# Patient Record
Sex: MALE | Race: WHITE | Employment: OTHER | URBAN - NONMETROPOLITAN AREA
[De-identification: names, ages, dates, MRNs, and addresses within clinical notes are randomized per-mention and may not be internally consistent; named-entity substitution may affect disease eponyms.]

---

## 2022-08-22 ENCOUNTER — OFFICE VISIT (OUTPATIENT)
Dept: FAMILY MEDICINE | Facility: OTHER | Age: 77
End: 2022-08-22
Attending: FAMILY MEDICINE
Payer: MEDICARE

## 2022-08-22 ENCOUNTER — LAB (OUTPATIENT)
Dept: LAB | Facility: OTHER | Age: 77
End: 2022-08-22
Payer: MEDICARE

## 2022-08-22 VITALS
DIASTOLIC BLOOD PRESSURE: 56 MMHG | HEART RATE: 54 BPM | OXYGEN SATURATION: 95 % | WEIGHT: 270 LBS | SYSTOLIC BLOOD PRESSURE: 96 MMHG | TEMPERATURE: 97.5 F | RESPIRATION RATE: 18 BRPM

## 2022-08-22 DIAGNOSIS — R73.03 PREDIABETES: ICD-10-CM

## 2022-08-22 DIAGNOSIS — M1A.9XX0 CHRONIC GOUT WITHOUT TOPHUS, UNSPECIFIED CAUSE, UNSPECIFIED SITE: ICD-10-CM

## 2022-08-22 DIAGNOSIS — I25.10 CORONARY ARTERY DISEASE INVOLVING NATIVE CORONARY ARTERY OF NATIVE HEART WITHOUT ANGINA PECTORIS: Primary | ICD-10-CM

## 2022-08-22 DIAGNOSIS — R60.0 LOCALIZED EDEMA: ICD-10-CM

## 2022-08-22 DIAGNOSIS — E11.9 DIABETES MELLITUS (H): Primary | ICD-10-CM

## 2022-08-22 DIAGNOSIS — E78.5 DYSLIPIDEMIA: ICD-10-CM

## 2022-08-22 PROBLEM — R41.3 AMNESIA: Status: ACTIVE | Noted: 2019-01-31
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
## 2022-08-22 PROBLEM — S06.9X9A: Status: ACTIVE | Noted: 2019-02-01

## 2022-08-22 PROBLEM — D69.6 THROMBOCYTOPENIA (H): Status: ACTIVE | Noted: 2021-01-24

## 2022-08-22 PROBLEM — K92.2 UPPER GI BLEED: Status: ACTIVE | Noted: 2021-01-23

## 2022-08-22 PROBLEM — E78.00 HYPERCHOLESTEROLEMIA: Status: ACTIVE | Noted: 2019-10-10

## 2022-08-22 PROBLEM — D53.9 MACROCYTIC ANEMIA: Status: ACTIVE | Noted: 2021-01-24

## 2022-08-22 PROBLEM — Z86.16 HISTORY OF COVID-19: Status: ACTIVE | Noted: 2021-01-24

## 2022-08-22 PROBLEM — D62 ANEMIA DUE TO ACUTE BLOOD LOSS: Status: ACTIVE | Noted: 2021-01-24

## 2022-08-22 LAB
ALBUMIN SERPL-MCNC: 4.2 G/DL (ref 3.4–5)
ALP SERPL-CCNC: 99 U/L (ref 40–150)
ALT SERPL W P-5'-P-CCNC: 35 U/L (ref 0–70)
ANION GAP SERPL CALCULATED.3IONS-SCNC: 7 MMOL/L (ref 3–14)
AST SERPL W P-5'-P-CCNC: 25 U/L (ref 0–45)
BILIRUB SERPL-MCNC: 1.4 MG/DL (ref 0.2–1.3)
BUN SERPL-MCNC: 27 MG/DL (ref 7–30)
CALCIUM SERPL-MCNC: 9.7 MG/DL (ref 8.5–10.1)
CHLORIDE BLD-SCNC: 109 MMOL/L (ref 94–109)
CO2 SERPL-SCNC: 24 MMOL/L (ref 20–32)
CREAT SERPL-MCNC: 1.15 MG/DL (ref 0.66–1.25)
CREAT UR-MCNC: 206 MG/DL
EST. AVERAGE GLUCOSE BLD GHB EST-MCNC: 123 MG/DL
GFR SERPL CREATININE-BSD FRML MDRD: 66 ML/MIN/1.73M2
GLUCOSE BLD-MCNC: 118 MG/DL (ref 70–99)
HBA1C MFR BLD: 5.9 % (ref 0–5.6)
POTASSIUM BLD-SCNC: 4.3 MMOL/L (ref 3.4–5.3)
PROT SERPL-MCNC: 7.2 G/DL (ref 6.8–8.8)
PROT UR-MCNC: 0.21 G/L
PROT/CREAT 24H UR: 0.1 G/G CR (ref 0–0.2)
SODIUM SERPL-SCNC: 140 MMOL/L (ref 133–144)
URATE SERPL-MCNC: 6.7 MG/DL (ref 3.5–7.2)

## 2022-08-22 PROCEDURE — 84550 ASSAY OF BLOOD/URIC ACID: CPT | Mod: ZL

## 2022-08-22 PROCEDURE — G0463 HOSPITAL OUTPT CLINIC VISIT: HCPCS

## 2022-08-22 PROCEDURE — 83036 HEMOGLOBIN GLYCOSYLATED A1C: CPT | Mod: ZL

## 2022-08-22 PROCEDURE — 84156 ASSAY OF PROTEIN URINE: CPT | Mod: ZL

## 2022-08-22 PROCEDURE — 36415 COLL VENOUS BLD VENIPUNCTURE: CPT | Mod: ZL

## 2022-08-22 PROCEDURE — 80061 LIPID PANEL: CPT | Mod: ZL

## 2022-08-22 PROCEDURE — 82310 ASSAY OF CALCIUM: CPT | Mod: ZL

## 2022-08-22 PROCEDURE — 82374 ASSAY BLOOD CARBON DIOXIDE: CPT | Mod: ZL

## 2022-08-22 PROCEDURE — 99204 OFFICE O/P NEW MOD 45 MIN: CPT | Performed by: FAMILY MEDICINE

## 2022-08-22 RX ORDER — TRAMADOL HYDROCHLORIDE 50 MG/1
100 TABLET ORAL
COMMUNITY

## 2022-08-22 RX ORDER — ATENOLOL 25 MG/1
1 TABLET ORAL DAILY
COMMUNITY
Start: 2022-06-06

## 2022-08-22 RX ORDER — ALLOPURINOL 100 MG/1
100 TABLET ORAL DAILY
COMMUNITY

## 2022-08-22 RX ORDER — HYDROCHLOROTHIAZIDE 25 MG/1
25 TABLET ORAL DAILY
COMMUNITY

## 2022-08-22 RX ORDER — ATORVASTATIN CALCIUM 40 MG/1
40 TABLET, FILM COATED ORAL DAILY
COMMUNITY

## 2022-08-22 RX ORDER — CLOPIDOGREL BISULFATE 75 MG/1
75 TABLET ORAL
COMMUNITY

## 2022-08-22 ASSESSMENT — ENCOUNTER SYMPTOMS
DIZZINESS: 0
FEVER: 0
LIGHT-HEADEDNESS: 0
CHEST TIGHTNESS: 0
PALPITATIONS: 0
SHORTNESS OF BREATH: 0

## 2022-08-22 ASSESSMENT — PAIN SCALES - GENERAL: PAINLEVEL: NO PAIN (0)

## 2022-08-22 NOTE — PROGRESS NOTES
Assessment & Plan     Coronary artery disease involving native coronary artery of native heart without angina pectoris  Localized edema  Chronic gout without tophus, unspecified cause, unspecified site  Dyslipidemia  Prediabetes    Would not increase his diuretic as only left LE swelling, sounds dependent/venous insufficiency related, and BP on lower end (asymptomatic).  Recommended Jobst knee high 20-30 mmHg compression socks.  Discussed he may want to try diabetic shoes or something similar so his shoe is not rubbing on his toe, no current infection.    He reports he does not need any refills sent in at this time.    He already had labs drawn earlier today here in the clinic (lab orders from his physician in [place]).  Will review results of these once available.  I asked that next time he should please see me first so I can get my name added to the lab orders so results will come to me automatically.    I asked him to ask his cardiologist why he is on Plavix instead of aspirin as patient does not know.    See back next summer unless any issues.      Only some of his labs are available at time of note signing: A1c 5.9 which is borderline prediabetes, CMP unremarkable aside from blood sugar 118, uric acid 6.7.  I will defer to his usual family physician regarding the uric acid level as they are managing this medication presently and he has not had any problems in many years.    Time spent today on day of service: 45 minutes including face-to-face, chart review, documentation.    ANNAMARIA CHAO, DO  [facility] [place]      Subjective   Neil is a 76 year old, presenting for the following health issues:  Establish Care      HPI     Neil is a 76-year-old male whom I am seeing today for the first time.  His primary family medicine physician is in [place], but he wants to be established with a physician up here as well as he and his wife Ryanne live up near [place] during the summer.  He does follow with  cardiology in [place].    He reports history of MI in 1986 and stent placement.  A few days later he had another stent placed.  His most recent stent was approximately 8 years ago by his report.  Not currently having any anginal symptoms.  He states he is still on Plavix but not aspirin.  He is unsure why.    He is having some pitting edema in the left leg, no numbness or tingling, no redness or warmth, no calf pain.  Seems to get better overnight and worsens throughout the day when he is on his feet.  He is currently on HCTZ 12.5 mg daily.  His blood pressure usually runs around 110 systolic at home, lower today in the office.  He is not having any orthostatic or lightheadedness symptoms.  Over-the-counter compression socks not helping.    He is on allopurinol for gout, he reports his last flare was 15 to 20 years ago.    He has a small raw spot on his distal dorsal left great toe just proximal to the nail from his shoe rubbing.    He reports history of right knee and right shoulder replacement, doing well with both.    He does not need any medication refill sent in at this time.    He reports his tetanus, pneumonia, and shingles vaccines are current.    He will be heading back to [place] 9/8/2022 and will be returning the middle of May 2023 depending on coordination with his wife's chemotherapy schedule.          Review of Systems   Constitutional: Negative for fever.   Respiratory: Negative for chest tightness and shortness of breath.    Cardiovascular: Negative for chest pain and palpitations.   Neurological: Negative for dizziness and light-headedness.            Objective    BP 96/56 (BP Location: Left arm, Patient Position: Sitting, Cuff Size: Adult Large)   Pulse 54   Temp 97.5  F (36.4  C) (Tympanic)   Resp 18   Wt 122.5 kg (270 lb)   SpO2 95%   There is no height or weight on file to calculate BMI.  Physical Exam  Constitutional:       General: He is not in acute distress.     Appearance: Normal  appearance.   HENT:      Head: Normocephalic and atraumatic.   Neck:      Vascular: No carotid bruit.   Cardiovascular:      Rate and Rhythm: Normal rate and regular rhythm.      Heart sounds: Normal heart sounds. No murmur heard.     Comments: 1+ LLE shin pitting, no RLE pitting.  DP 2/4 bilat  Pulmonary:      Effort: Pulmonary effort is normal.      Breath sounds: Normal breath sounds. No wheezing.   Lymphadenopathy:      Cervical: No cervical adenopathy.   Skin:     Comments: There is a small minimally irritated area just proximal to the left great toenail, no wound, no cellulitis.  Both feet otherwise unremarkable.   Neurological:      Mental Status: He is alert and oriented to person, place, and time.                    .  ..

## 2022-08-22 NOTE — NURSING NOTE
Chief Complaint   Patient presents with     Establish Care       Initial BP 96/56 (BP Location: Left arm, Patient Position: Sitting, Cuff Size: Adult Large)   Pulse 54   Temp 97.5  F (36.4  C) (Tympanic)   Resp 18   Wt 122.5 kg (270 lb)   SpO2 95%  There is no height or weight on file to calculate BMI.  Medication Reconciliation: complete  Annmarie Swanson LPN

## 2022-08-23 LAB
CHOLEST SERPL-MCNC: 99 MG/DL
HDLC SERPL-MCNC: 35 MG/DL
LDLC SERPL CALC-MCNC: 47 MG/DL
NONHDLC SERPL-MCNC: 64 MG/DL
TRIGL SERPL-MCNC: 87 MG/DL

## 2022-08-25 ENCOUNTER — DOCUMENTATION ONLY (OUTPATIENT)
Dept: OTHER | Facility: CLINIC | Age: 77
End: 2022-08-25